# Patient Record
(demographics unavailable — no encounter records)

---

## 2024-10-11 NOTE — HISTORY OF PRESENT ILLNESS
[FreeTextEntry1] : Pt reports that she had inadvertently stopped Singulair and that she was sleeping better.  Pt stopped taking an iron supplement, her doctors are through [facility].  Pt has issues as well with her kidney function and was slightly manic as well.  Pt reports that the seniors near her may not be physically as capable as she is.  Pt will go to the library for resources, pt will need to avail herself of local events.  Pt does not have dementia; mood has been mainly at baseline with some breakthrough delgado.  Pt discussing life transitions as well as her daughters' on going mental health struggles.

## 2024-10-11 NOTE — PLAN
[No] : No [Medication education provided] : Medication education provided. [Rationale for medication choices, possible risks/precautions, benefits, alternative treatment choices, and consequences of non-treatment discussed] : Rationale for medication choices, possible risks/precautions, benefits, alternative treatment choices, and consequences of non-treatment discussed with patient/family/caregiver  [FreeTextEntry4] : Meeting goals of care- and is clinically stable but may retire next month as well as having change in both finances and insurance.  [FreeTextEntry5] : I STOP checked, continue Depakote, and alprazolam.  Pt may try tryptophan for sleep, as an OTC med, encouraged to monitor mood, unclear the benefits vs risks - however the current medication regimen for sleep has its own challenges due to potential change in reflexes, falls, and cognitive impairment.  Pt aware of long-term benefits of mood stabilizations.

## 2024-10-11 NOTE — RESULTS/DATA
Detail Level: Zone [FreeTextEntry1] : Pt currently paced, not in a fib currently has a pacemaker - Labs from September 9th.  Qtc= 477 (atrial paced rhythm with prolonged AV conduction and right bundle branch block and Possible lateral infarct age undetermined.  Iron 54   Hg/HCT= 10/9/34.6 Platelets 144 TIBC= 250  Iron sat low normal 15 Ferritin 245 CMP BUN=27 (high norm= 23) Na= 134 creatinine 1.62 K=5.2 rest of CMP wnl Render In Strict Bullet Format?: No Discontinue Regimen: Imiquimod

## 2024-10-11 NOTE — PHYSICAL EXAM
[Well groomed] : well groomed [Accelerated] : accelerated [Cooperative] : cooperative [Euthymic] : euthymic [Full] : full [Clear] : clear [Linear/Goal Directed] : linear/goal directed [None] : none [None Reported] : none reported [Average] : average [WNL] : within normal limits [FreeTextEntry1] : Pt is at baseline.  [de-identified] : Pt states mood tends to be worse when she does not work out.

## 2025-01-07 NOTE — REASON FOR VISIT
[Follow-Up] : a follow-up visit [FreeTextEntry1] : abnormal PFTs, asthma, obesity, PND, poor sleep, snoring, OSAS, and SOB

## 2025-01-07 NOTE — PHYSICAL EXAM
[No Acute Distress] : no acute distress [Normal Oropharynx] : normal oropharynx [III] : Mallampati Class: III [Normal Appearance] : normal appearance [No Neck Mass] : no neck mass [Normal Rate/Rhythm] : normal rate/rhythm [No Murmurs] : no murmurs [Normal S1, S2] : normal s1, s2 [No Resp Distress] : no resp distress [Clear to Auscultation Bilaterally] : clear to auscultation bilaterally [No Abnormalities] : no abnormalities [Benign] : benign [Normal Gait] : normal gait [No Clubbing] : no clubbing [No Cyanosis] : no cyanosis [No Edema] : no edema [FROM] : FROM [Normal Color/ Pigmentation] : normal color/ pigmentation [No Focal Deficits] : no focal deficits [Oriented x3] : oriented x3 [Normal Affect] : normal affect [TextBox_2] : OW [TextBox_68] : I:E 1:3, clear

## 2025-01-07 NOTE — ADDENDUM
[FreeTextEntry1] : Documented by Nolan Trinidad acting as a scribe for Dr. Rivera Ibarra on 01/07/2025. All medical record entries made by the Scribe were at my, Dr. Rivera Ibarra's, direction and personally dictated by me on 01/07/2025. I have reviewed the chart and agree that the record accurately reflects my personal performance of the history, physical exam, assessment and plan. I have also personally directed, reviewed, and agree with the discharge instructions.

## 2025-01-07 NOTE — HISTORY OF PRESENT ILLNESS
[FreeTextEntry1] : Ms. Amado is a 69 year old female with a history of abnormal PFTs, asthma, obesity, PND, poor sleep, snoring, and SOB comes in for a follow-up pulmonary evaluation. Her chief complaint is  -she notes officially retiring  -she notes energy levels are improved  -she notes going for iron infusions x 4  -she notes going for an endoscopy and colonoscopy  -she notes being on potassium  -she notes losing weight -she notes exercising -she notes she is not using her Breo and Montelukast bc her asthma is more active in the summer  -she notes vision is stable -she denies dysphonia -she notes her breathing is stable  -she notes having an episode of AFIB and having to undergo a cardioversion   -she denies any headaches, nausea, emesis, fever, chills, sweats, chest pain, chest pressure, coughing, wheezing, palpitations, diarrhea, constipation, dysphagia, vertigo, arthralgias, myalgias, leg swelling, itchy eyes, itchy ears, heartburn, reflux, or sour taste in the mouth.

## 2025-01-07 NOTE — ASSESSMENT
[FreeTextEntry1] : Ms. Elizalde is a 69 year old female with hx of HLD, DM, HTN, spindle cell carcinoma, bipolar, nonsmoker, AFib, overweight, asthma, allergy, and GERD- s/p Bariatric surgery (gastric sleeve) 10/2018. - improved overall, approximately 150 lb weight loss- s/p acute bronchitis / asthmatic bronchitis (6/19) - s/p acute sinusitis (again) 2/2022- poor sleep (working w/ DD for OSAS confirmed) - s/p [facility] EPS intervention (unsuccessful)- s/p ablation 3/2024, 8/2024 - s/p with iron deficiency s/p multiple iron infusions - stable   Her SOB is multifactorial due to: -overweight / out of shape -poor mechanics of breathing -anemia -asthma -cardiac disease  problem 1: asthma (stable) -Methacholine challenge (+) -continue Ventolin 2 puffs Q6H and pre exercise -continue Breo Ellipta 100 QD transition to 100 qDay (QOD) -add Xopenex inhaler, pre exercise 2 puffs -continue albuterol via HHN 1 unit dose up to QID -s/p Singulair 10 mg QHS off 2024 -Inhaler technique reviewed as well as oral hygiene techniques reviewed with patient. Avoidance of cold air, extremes of temperature, rescue inhaler should be used before exercise. Order of medication reviewed with patient. Recommended use of a cool mist humidifier in the bedroom. Instructed to gargle and spit after inhaler use. -Asthma is believed to be caused by inherited (genetic) and environmental factor, but its exact cause is unknown. Asthma may be triggered by allergens, lung infections, or irritants in the air. Asthma triggers are different for each person  Problem 1A: Fe anemia -iron infusions -s/p Wes et al. -recommended hematin anemia guard  problem 2: PND syndrome / allergies -Recommended to use the "Navage" saline sinus rinse system -recommended to use Xlear nasal saline spray -continue Astelin 0.15% nasal spray 1 sniff each nostril BID / Add Flonase 1 sniff/nostril BID -Environmental measures for allergies were encouraged including mattress and pillow cover, air purifier, and environmental controls.  problem 3: overweight s/p GBP surgery (150 lbs down) - Recommended "10-Day Detox" by Lebron Rebollar for 2-3 weeks followed by probiotics -recommended "MUNIQ" OTC supplement -recommend Delphine Ramirez - nutritionist/ "[facility]ark" -Weight loss, exercise, and diet control were discussed and are highly encouraged. Treatment options were given such as, aqua therapy, and contacting a nutritionist. Recommended to use the elliptical, stationary bike, less use of treadmill. Mindful eating was explained to the patient Obesity is associated with worsening asthma, shortness of breath, and potential for cardiac disease, diabetes, and other underlying medical conditions.  problem 4: poor mechanics of breathing -Recommended Wim Hof and Buteyko breathing techniques -Proper breathing techniques were reviewed with an emphasis of exhalation. Patient instructed to breath in for 1 second and out for four seconds. Patient was encouraged to not talk while walking.  problem 5: cardiac issues -following up with cardiologist Dr. Kim/Maxx (s/p ablation)- s/p 2nd ablation 3/2024, 8/2024 D[facility] -No contraindication to continuing beta blocker  problem 6: primary snoring / EDS c/w OSAS -pending sleep study results with oral appliance - s/p HSS -DD in place ([facility]) - on hydroxyzine 10 mg QHS -recommended to use Oxy Aid and Nozovent -elevated Mallampati class Good sleep hygiene was encouraged including avoiding watching television an hour before bed, keeping caffeine at a low, avoiding reading, television, or anything, in bed, no drinking any liquids three hours before bedtime, and only getting into bed when tired and ready for sleep.  problem 7: abnormal PFTs -most c/w patient's body habits being restrictive dysfunction -should improve with weight loss  problem 8: Health Maintenance/COVID19 Precautions: -Vaccine Moderna x4 - Discussed and recommended to wait for the updated booster - Clean your hands often. Wash your hands often with soap and water for at least 20 seconds, especially after blowing your nose, coughing, or sneezing, or having been in a public place. - If soap and water are not available, use a hand  that contains at least 60% alcohol. - To the extent possible, avoid touching high-touch surfaces in public places - elevator buttons, door handles, handrails, handshaking with people, etc. Use a tissue or your sleeve to cover your hand or finger if you must touch something. - Wash your hands after touching surfaces in public places. - Avoid touching your face, nose, eyes, etc. - Clean and disinfect your home to remove germs: practice routine cleaning of frequently touched surfaces (for example: tables, doorknobs, light switches, handles, desks, toilets, faucets, sinks & cell phones) - Avoid crowds, especially in poorly ventilated spaces. Your risk of exposure to respiratory viruses like COVID-19 may increase in crowded, closed-in settings with little air circulation if there are people in the crowd who are sick. All patients are recommended to practice social distancing and stay at least 6 feet away from others. - Avoid all non-essential travel including plane trips, and especially avoid embarking on cruise ships. -If COVID-19 is spreading in your community, take extra measures to put distance between yourself and other people to further reduce your risk of being exposed to this new virus. -Stay home as much as possible. - Consider ways of getting food brought to your house through family, social, or commercial networks -Be aware that the virus has been known to live in the air up to 3 hours post exposure, cardboard up to 24 hours post exposure, copper up to 4 hours post exposure, steel and plastic up to 2-3 days post exposure. Risk of transmission from these surfaces are affected by many variables. Immune Support Recommendations: -OTC Vitamin C 500mg BID -OTC Quercetin 250-500mg BID -OTC Zinc 75-100mg per day -OTC Melatonin 1 or 2 mg a night -OTC Vitamin D 1-4000mg per day -OTC Tonic Water 8oz per day Asthma and COVID19: You need to make sure your asthma is under control. This often requires the use of inhaled corticosteroids (and sometimes oral corticosteroids). Inhaled corticosteroids do not likely reduce your immune system's ability to fight infections, but oral corticosteroids may. It is important to use the steps above to protect yourself to limit your exposure to any respiratory virus.  problem 9: health maintenance -recommended "MUNIQ" OTC supplement -recommended to read: "The Gift of Maybe," by Nicole Hernandez -s/p influenza vaccination October 2023 -s/p RSV vaccine  -s/p Shingrix vaccine -s/p COVID-19 booster 2023 -recommended strep pneumonia vaccines: Prevnar-13 vaccine (2020), followed by Pneumo vaccine 23 (2016/2023) one year following -recommended early intervention for URIs -recommended regular osteoporosis evaluations -recommended early dermatological evaluations -recommended after the age of 50 to the age of 70, colonoscopy every 5 years  F/P in 4 months with SPI she is encouraged to call with any questions, concerns, or changes.

## 2025-01-07 NOTE — PROCEDURE
[FreeTextEntry1] : PFTs revealed normal flows; FEV1 was  2.10L, which is 87% of predicted; normal flow volume loop.  PFTs were performed to evaluate for SOB  FENO was unable; a normal value being less than 25 Fractional exhaled nitric oxide (FENO) is regarded as a simple, noninvasive method for assessing eosinophilic airway inflammation. Produced by a variety of cells within the lung, nitric oxide (NO) concentrations are generally low in healthy individuals. However, high concentrations of NO appear to be involved in nonspecific host defense mechanisms and chronic inflammatory diseases such as asthma. The American Thoracic Society (ATS) therefore has recommended using FENO to aid in the diagnosis and monitoring of eosinophilic airway inflammation and asthma, and for identifying steroid responsive individuals whose chronic respiratory symptoms may be caused by airway inflammation.   The American Thoracic Society (ATS) strongly recommends the use of FeNO measurement to aid in the assessment, management, and long-term monitoring of asthma. In their 2011 clinical practice guideline, the ATS emphasizes the importance of using FeNO.

## 2025-01-30 NOTE — REVIEW OF SYSTEMS
[Negative] : Allergic/Immunologic [FreeTextEntry8] : see interval history [de-identified] : see interval history

## 2025-01-30 NOTE — PLAN
[No] : No [Medication education provided] : Medication education provided. [Rationale for medication choices, possible risks/precautions, benefits, alternative treatment choices, and consequences of non-treatment discussed] : Rationale for medication choices, possible risks/precautions, benefits, alternative treatment choices, and consequences of non-treatment discussed with patient/family/caregiver  [FreeTextEntry4] : Meeting goals of care.  Pt is taking brand name Depakote due to better response to ER vs DR preparation for mood disorder.  Alprazolam for sleep- and anxiety. Pt has not been able to tolerate other medications for sleep.  [FreeTextEntry5] : Pt had to adjust to new lifestyle, and its limitations with restaurants.  Pt with less opportunities due to winter weather.  I stop checked. A	Y	N	B	10/11/2024	10/14/2024	alprazolam 1 mg tablet	60	30	Kathi Jensen MD	[id number]	Insurance	[id number] A	Y	N	B	10/11/2024	10/14/2024	alprazolam 0.25 mg tablet	60	30	Kathi Jensen MD	[id number]	Insurance	[facility] #[id number]

## 2025-01-30 NOTE — PHYSICAL EXAM
[Well groomed] : well groomed [Accelerated] : accelerated [Cooperative] : cooperative [Full] : full [Clear] : clear [Linear/Goal Directed] : linear/goal directed [None] : none [None Reported] : none reported [Average] : average [WNL] : within normal limits [Depressed] : depressed [FreeTextEntry1] : Pt is at baseline.

## 2025-01-30 NOTE — HISTORY OF PRESENT ILLNESS
[FreeTextEntry1] : Pt reports at times she will wake hungry and will eat breakfast early and take her Metformin and Multaq.  At times she will take Alprazolam and sleep may be affected by her not going to work. Pt continues to swim.  Pt felt she was exhausted at work- so may have had more fatigue allowing for better sleep.  Pt may not be in a fib, has a pacemaker and is depressed regarding the limitation of her diet, and mood is typically worse in the winter.  Pt kidney function related to diabetes. 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  Pt goes to the library but is aware she does not have enough social interaction.

## 2025-03-27 NOTE — HISTORY OF PRESENT ILLNESS
[FreeTextEntry1] : Pt reports she has retired and has lost some stimulation.  Discussed home repairs she has to attend to, she reports not wanting to sell her home at this point.  Pt encouraged to go to the senior center. Pt reports mood is " a little off".  Pt reports that she was told that Alprazolam can trigger delgado.  Pt remains out of a fib.

## 2025-03-27 NOTE — PLAN
[No] : No [Medication education provided] : Medication education provided. [Rationale for medication choices, possible risks/precautions, benefits, alternative treatment choices, and consequences of non-treatment discussed] : Rationale for medication choices, possible risks/precautions, benefits, alternative treatment choices, and consequences of non-treatment discussed with patient/family/caregiver  [FreeTextEntry4] : Meeting goals of care.  Pt reports chronic kidney disease stage 3 A [FreeTextEntry5] : I stop checked.  A	Y	N	B	01/30/2025	01/31/2025	alprazolam 1 mg tablet	60	30	Kathi Jensen MD	[id number]	Medicare	[id number] A	Y	N	B	01/30/2025	01/31/2025	alprazolam 0.25 mg tablet	60	30	Kathi Jensen MD	[id number]	Medicare	[id number] Pt to continue Depakote 500 mg bid (needs 250 mg pills due to size and gastric sleeve).  Pt to continue alprazolam as other sleep agents either cause constipation or have been ineffective.

## 2025-03-27 NOTE — PHYSICAL EXAM
[Well groomed] : well groomed [Accelerated] : accelerated [Cooperative] : cooperative [Euthymic] : euthymic [Full] : full [Clear] : clear [Linear/Goal Directed] : linear/goal directed [None] : none [None Reported] : none reported [Average] : average [WNL] : within normal limits [FreeTextEntry1] : Pt is at baseline.

## 2025-03-27 NOTE — PLAN
[No] : No [Medication education provided] : Medication education provided. [Rationale for medication choices, possible risks/precautions, benefits, alternative treatment choices, and consequences of non-treatment discussed] : Rationale for medication choices, possible risks/precautions, benefits, alternative treatment choices, and consequences of non-treatment discussed with patient/family/caregiver  [FreeTextEntry4] : Meeting goals of care.  Pt reports chronic kidney disease stage 3 A [FreeTextEntry5] : I stop checked.  A	Y	N	B	01/30/2025	01/31/2025	alprazolam 1 mg tablet	60	30	Kathi Jensen MD	[id number]	Medicare	[id number] A	Y	N	B	01/30/2025	01/31/2025	alprazolam 0.25 mg tablet	60	30	Kathi Jensen MD	[id number]	Medicare	[id number] Pt to continue Depakote 500 mg bid (needs 250 mg pills due to size and gastric sleeve).  Pt to continue alprazolam as other sleep agents either cause constipation or have been ineffective.

## 2025-05-08 NOTE — PLAN
[No] : No [Medication education provided] : Medication education provided. [Rationale for medication choices, possible risks/precautions, benefits, alternative treatment choices, and consequences of non-treatment discussed] : Rationale for medication choices, possible risks/precautions, benefits, alternative treatment choices, and consequences of non-treatment discussed with patient/family/caregiver  [FreeTextEntry4] : Continue Depakote  mg (2 tabs twice per day). Continue Alprazolam 0.25 and 1 mg for sleep (pt has not been able to take other steep medications without significant side effects).  [FreeTextEntry5] : I stop checked.  A	Y	N	B	03/27/2025	03/27/2025	alprazolam 1 mg tablet	60	30	Kathi Jensen MD	[id number]	Medicare	[id number] A	Y	N	B	03/27/2025	03/27/2025	alprazolam 0.25 mg tablet	60	30	Kathi Jensen MD	[id number]	Medicare	[id number] Pt is clinically stable.

## 2025-05-08 NOTE — PHYSICAL EXAM
[Cooperative] : cooperative [Euthymic] : euthymic [Full] : full [Clear] : clear [Linear/Goal Directed] : linear/goal directed [Average] : average [WNL] : within normal limits [None] : none [None Reported] : none reported

## 2025-05-08 NOTE — HISTORY OF PRESENT ILLNESS
[FreeTextEntry1] : Pt reports her mood is in an upswing, and she spent money on clothing after weight loss.  Pt discussed the expense of traveling to [place] in December. Pt is woken by nela early (3 am, and then falls back to sleep).  Pt reports she will visit a friend in [place]. Pt has apprehension regarding finances. Pt has a mouth guard for sleep apnea.  Pt is still out of a fib. Pt reports her sister Di has breast cancer, pt reports her sister Evangelist is housebound. Both her sisters she perceives as negative.  Pt discussed living on a fixed income and making conscious choices. Pt continues to swim and exercise.  Pt had the situation resolved that was discussed with last session.

## 2025-07-10 NOTE — PLAN
[No] : No [Medication education provided] : Medication education provided. [Rationale for medication choices, possible risks/precautions, benefits, alternative treatment choices, and consequences of non-treatment discussed] : Rationale for medication choices, possible risks/precautions, benefits, alternative treatment choices, and consequences of non-treatment discussed with patient/family/caregiver  [FreeTextEntry4] : Meeting goals of care.  [FreeTextEntry5] : I stop checked.  A	Y	N	B	05/08/2025	05/10/2025	alprazolam 1 mg tablet	60	30	Kathi Jensen MD	[id number] Continue Depakote Er (two) tablets twice daily. Needs this dose due to gastric sleeve.  Continue Alprazolam 1-2 mg po q hs prn insomnia.

## 2025-07-10 NOTE — HISTORY OF PRESENT ILLNESS
[FreeTextEntry1] : Pt reports halfway is slow and would like to socialize more.  Pt did visit a friend in [place]. Pt reports she does not want to return to full time work.  Pt reports she just nods out during the afternoon. Pt states her nighttime sleep is good.  Advised to decrease Alprazolam back to 1 mg.  Pt remains on Ozempic but has increased appetite. Pt being followed by endocrinology.  Rest of psychiatric ROS unremarkable or at baseline.  [FreeTextEntry2] : In [facility].  Dr. Pam Rivers previous prescriber.  [FreeTextEntry3] : Hydroxyzine Geodon (severe Parkinsonism).  Rozerem/ Restoril/Ramelteon [place] (hospitalized for Lithium toxicity/ brief dialysis).  Wellbutrin, Trazodone, Benadryl, Serzone, Effexor, Celexa (worsening anxiety and increased constipation).

## 2025-07-10 NOTE — PLAN
[No] : No [Medication education provided] : Medication education provided. [Rationale for medication choices, possible risks/precautions, benefits, alternative treatment choices, and consequences of non-treatment discussed] : Rationale for medication choices, possible risks/precautions, benefits, alternative treatment choices, and consequences of non-treatment discussed with patient/family/caregiver  [FreeTextEntry4] : Meeting goals of care.  [FreeTextEntry5] : I stop checked.  A	Y	N	B	05/08/2025	05/10/2025	alprazolam 1 mg tablet	60	30	Kathi Jensen MD	[id number] Continue Depakote Er (two) tablets twice daily. Needs this dose due to gastric sleeve.  Continue Alprazolam 1-2 mg po q hs prn insomnia.

## 2025-07-10 NOTE — HISTORY OF PRESENT ILLNESS
[FreeTextEntry1] : Pt reports prison is slow and would like to socialize more.  Pt did visit a friend in [place]. Pt reports she does not want to return to full time work.  Pt reports she just nods out during the afternoon. Pt states her nighttime sleep is good.  Advised to decrease Alprazolam back to 1 mg.  Pt remains on Ozempic but has increased appetite. Pt being followed by endocrinology.  Rest of psychiatric ROS unremarkable or at baseline.  [FreeTextEntry2] : In patient [facility] 2008.  Dr. Pam Rivers previous prescriber.  [FreeTextEntry3] : Hydroxyzine Geodon (severe Parkinsonism).  Rozerem/ Restoril/Ramelteon [place] (hospitalized for Lithium toxicity/ brief dialysis).  Wellbutrin, Trazodone, Benadryl, Serzone, Effexor, Celexa (worsening anxiety and increased constipation).

## 2025-07-22 NOTE — ASSESSMENT
[FreeTextEntry1] : Ms. Elizalde is a 69 year old female with hx of HLD, DM, HTN, spindle cell carcinoma, bipolar, nonsmoker, AFib, overweight, asthma, allergy, and GERD- s/p Bariatric surgery (gastric sleeve) 10/2018. - improved overall, approximately 150 lb weight loss- s/p acute bronchitis / asthmatic bronchitis (6/19) - s/p acute sinusitis (again) 2/2022- poor sleep (working w/ DD for OSAS confirmed) - s/p [facility] EPS intervention (unsuccessful)- s/p ablation 3/2024, 8/2024 - s/p with iron deficiency s/p multiple iron infusions - stable/thriving on Ozempic   Her SOB is multifactorial due to: -overweight / out of shape -poor mechanics of breathing -anemia -asthma -cardiac disease  problem 1: asthma (stable)-SMART Rx  -Methacholine challenge (+) -continue Ventolin 2 puffs Q6H and pre exercise -discontinue Breo Ellipta 100 QD transition to 100 qDay (QOD)-move to Symbicort 160 2 inhalations BID -add Xopenex inhaler, pre exercise 2 puffs -continue albuterol via HHN 1 unit dose up to QID -s/p Singulair 10 mg QHS off 2024 -Inhaler technique reviewed as well as oral hygiene techniques reviewed with patient. Avoidance of cold air, extremes of temperature, rescue inhaler should be used before exercise. Order of medication reviewed with patient. Recommended use of a cool mist humidifier in the bedroom. Instructed to gargle and spit after inhaler use. -Asthma is believed to be caused by inherited (genetic) and environmental factor, but its exact cause is unknown. Asthma may be triggered by allergens, lung infections, or irritants in the air. Asthma triggers are different for each person  Problem 1A: Fe anemia -iron infusions -s/p Latia et al. -recommended hematin anemia guard  problem 2: PND syndrome / allergies -Recommended to use the "Navage" saline sinus rinse system -recommended to use Xlear nasal saline spray -continue Astelin 0.15% nasal spray 1 sniff each nostril BID / Add Flonase 1 sniff/nostril BID -Environmental measures for allergies were encouraged including mattress and pillow cover, air purifier, and environmental controls.  problem 3: overweight s/p GBP surgery (150 lbs down) - Recommended "10-Day Detox" by Lebron Rebollar for 2-3 weeks followed by probiotics -recommended "MUNIQ" OTC supplement -recommend Delphine Ramirez - nutritionist/ "[facility]" -Weight loss, exercise, and diet control were discussed and are highly encouraged. Treatment options were given such as, aqua therapy, and contacting a nutritionist. Recommended to use the elliptical, stationary bike, less use of treadmill. Mindful eating was explained to the patient Obesity is associated with worsening asthma, shortness of breath, and potential for cardiac disease, diabetes, and other underlying medical conditions.  problem 4: poor mechanics of breathing -Recommended Wim Hof and Buteyko breathing techniques -Proper breathing techniques were reviewed with an emphasis of exhalation. Patient instructed to breath in for 1 second and out for four seconds. Patient was encouraged to not talk while walking.  problem 5: cardiac issues -following up with cardiologist Dr. Kim/Maxx (s/p ablation)- s/p 2nd ablation 3/2024, 8/2024 D[facility]V -No contraindication to continuing beta blocker  problem 6: primary snoring / EDS c/w OSAS -pending sleep study results with oral appliance - s/p HSS -DD in place ([facility]) - on hydroxyzine 10 mg QHS -recommended to use Oxy Aid and Nozovent -elevated Mallampati class Good sleep hygiene was encouraged including avoiding watching television an hour before bed, keeping caffeine at a low, avoiding reading, television, or anything, in bed, no drinking any liquids three hours before bedtime, and only getting into bed when tired and ready for sleep.  problem 7: abnormal PFTs -most c/w patient's body habits being restrictive dysfunction -should improve with weight loss  problem 8: Health Maintenance/COVID19 Precautions: -Vaccine Moderna x4 - Discussed and recommended to wait for the updated booster - Clean your hands often. Wash your hands often with soap and water for at least 20 seconds, especially after blowing your nose, coughing, or sneezing, or having been in a public place. - If soap and water are not available, use a hand  that contains at least 60% alcohol. - To the extent possible, avoid touching high-touch surfaces in public places - elevator buttons, door handles, handrails, handshaking with people, etc. Use a tissue or your sleeve to cover your hand or finger if you must touch something. - Wash your hands after touching surfaces in public places. - Avoid touching your face, nose, eyes, etc. - Clean and disinfect your home to remove germs: practice routine cleaning of frequently touched surfaces (for example: tables, doorknobs, light switches, handles, desks, toilets, faucets, sinks & cell phones) - Avoid crowds, especially in poorly ventilated spaces. Your risk of exposure to respiratory viruses like COVID-19 may increase in crowded, closed-in settings with little air circulation if there are people in the crowd who are sick. All patients are recommended to practice social distancing and stay at least 6 feet away from others. - Avoid all non-essential travel including plane trips, and especially avoid embarking on cruise ships. -If COVID-19 is spreading in your community, take extra measures to put distance between yourself and other people to further reduce your risk of being exposed to this new virus. -Stay home as much as possible. - Consider ways of getting food brought to your house through family, social, or commercial networks -Be aware that the virus has been known to live in the air up to 3 hours post exposure, cardboard up to 24 hours post exposure, copper up to 4 hours post exposure, steel and plastic up to 2-3 days post exposure. Risk of transmission from these surfaces are affected by many variables. Immune Support Recommendations: -OTC Vitamin C 500mg BID -OTC Quercetin 250-500mg BID -OTC Zinc 75-100mg per day -OTC Melatonin 1 or 2 mg a night -OTC Vitamin D 1-4000mg per day -OTC Tonic Water 8oz per day Asthma and COVID19: You need to make sure your asthma is under control. This often requires the use of inhaled corticosteroids (and sometimes oral corticosteroids). Inhaled corticosteroids do not likely reduce your immune system's ability to fight infections, but oral corticosteroids may. It is important to use the steps above to protect yourself to limit your exposure to any respiratory virus.  problem 9: health maintenance -recommended "MUNIQ" OTC supplement -recommended to read: "The Gift of Maybe," by Nicole Hernandez -s/p influenza vaccination October 2023 -s/p RSV vaccine  -s/p Shingrix vaccine -s/p COVID-19 booster 2023 -recommended strep pneumonia vaccines: Prevnar-13 vaccine (2020), followed by Pneumo vaccine 23 (2016/2023) one year following -recommended early intervention for URIs -recommended regular osteoporosis evaluations -recommended early dermatological evaluations -recommended after the age of 50 to the age of 70, colonoscopy every 5 years  F/P in 4 months with [facility] she is encouraged to call with any questions, concerns, or changes.

## 2025-07-22 NOTE — HISTORY OF PRESENT ILLNESS
[FreeTextEntry1] : Ms. Amado is a 69 year old female with a history of abnormal PFTs, asthma, obesity, PND, poor sleep, snoring, and SOB comes in for a follow-up pulmonary evaluation.   -she notes stopping Breo  -she notes taking Ozempic  -she notes occasional diarrhea  -she notes vision is stable  -she notes seeing Dr. Jeffery (nephrologist)  -she notes good quality of sleep -she notes swimming  -she notes half-way from job  -she notes doing iron infusions  -she notes weight is stable -she notes taking lowest dose of Ozempic (0.25)   -she denies any headaches, nausea, emesis, fever, chills, sweats, chest pain, chest pressure, coughing, wheezing, palpitations, diarrhea, constipation, dysphagia, vertigo, arthralgias, myalgias, leg swelling, itchy eyes, itchy ears, heartburn, reflux, or sour taste in the mouth

## 2025-07-22 NOTE — PROCEDURE
[FreeTextEntry1] : Full PFT reveals normal flows; FEV1 was 2.12 L which is 89% of predicted; normal lung volumes; low normal diffusion at 14.3, which is 70% of predicted, corrects to 84%; normal flow volume loop.   PFTs were performed to evaluate for asthma  FENO was patient unable ; a normal value being less than 25  Fractional exhaled nitric oxide (FENO) is regarded as a simple, noninvasive method for assessing eosinophilic airway inflammation. Produced by a variety of cells within the lung, nitric oxide (NO) concentrations are generally low in healthy individuals. However, high concentrations of NO appear to be involved in nonspecific host defense mechanisms and chronic inflammatory diseases such as asthma. The American Thoracic Society (ATS) therefore has recommended using FENO to aid in the diagnosis and monitoring of eosinophilic airway inflammation and asthma, and for identifying steroid responsive individuals whose chronic respiratory symptoms may be caused by airway inflammation.

## 2025-07-22 NOTE — ADDENDUM
[FreeTextEntry1] : Documented by Estelle Dominguez acting as a scribe for Dr. Rivera Ibarra on 07/22/2025. All medical record entries made by the Scribe were at my, Dr. Rivera Ibarra's, direction and personally dictated by me on 07/22/2025.  I have reviewed the chart and agree that the record accurately reflects my personal performance of the history, physical exam, assessment and plan. I have also personally directed, reviewed, and agree with the discharge instructions.